# Patient Record
Sex: FEMALE | Race: WHITE | NOT HISPANIC OR LATINO | URBAN - NONMETROPOLITAN AREA
[De-identification: names, ages, dates, MRNs, and addresses within clinical notes are randomized per-mention and may not be internally consistent; named-entity substitution may affect disease eponyms.]

---

## 2023-11-06 ENCOUNTER — HOSPITAL ENCOUNTER (EMERGENCY)
Facility: HOSPITAL | Age: 48
Discharge: HOME | End: 2023-11-06
Attending: FAMILY MEDICINE
Payer: MEDICAID

## 2023-11-06 VITALS
DIASTOLIC BLOOD PRESSURE: 82 MMHG | HEIGHT: 62 IN | HEART RATE: 88 BPM | TEMPERATURE: 98 F | OXYGEN SATURATION: 99 % | WEIGHT: 134 LBS | SYSTOLIC BLOOD PRESSURE: 122 MMHG | RESPIRATION RATE: 18 BRPM | BODY MASS INDEX: 24.66 KG/M2

## 2023-11-06 DIAGNOSIS — K11.8 PAROTID GLAND FULLNESS: ICD-10-CM

## 2023-11-06 DIAGNOSIS — K11.5 CALCULUS OF PAROTID GLAND: Primary | ICD-10-CM

## 2023-11-06 PROCEDURE — 99283 EMERGENCY DEPT VISIT LOW MDM: CPT

## 2023-11-06 RX ORDER — AMOXICILLIN AND CLAVULANATE POTASSIUM 875; 125 MG/1; MG/1
1 TABLET, FILM COATED ORAL 2 TIMES DAILY
Qty: 20 TABLET | Refills: 0 | Status: SHIPPED | OUTPATIENT
Start: 2023-11-06 | End: 2023-11-16

## 2023-11-06 NOTE — DISCHARGE INSTRUCTIONS
If gland swelling becomes more prominent, you develop fevers, with the area becomes red hard or swollen, returned to the emergency room or see your PCP for further evaluation.  If it does not resolve within 2 weeks, also consider seeing your PCP for a recheck.

## 2023-11-06 NOTE — ED PROVIDER NOTES
Encounter Date: 11/6/2023       History     Chief Complaint   Patient presents with    Facial Swelling     ONSET TONIGHT. PT C/O LEFT SIDED FACIAL SWELLING. DENIES PAIN. LOOKED IT UP ON GOGGLE AND IT SCARED THEM WHAT IT MIGHT COULD BE.      Patient presents to the emergency room with a 1 day history of swelling in the left side of her face.  She describes it happened all the sudden.  She denies fever chills and only has mild uncomfortable feeling.    The history is provided by the patient.     Review of patient's allergies indicates:  No Known Allergies  History reviewed. No pertinent past medical history.  History reviewed. No pertinent surgical history.  History reviewed. No pertinent family history.     Review of Systems   Constitutional:  Negative for fever.   HENT:  Negative for sore throat.    Respiratory:  Negative for shortness of breath.    Cardiovascular:  Negative for chest pain.   Gastrointestinal:  Negative for nausea.   Genitourinary:  Negative for dysuria.   Musculoskeletal:  Negative for back pain.   Skin:  Negative for rash.   Neurological:  Negative for weakness.   Hematological:  Does not bruise/bleed easily.   All other systems reviewed and are negative.      Physical Exam     Initial Vitals [11/06/23 0231]   BP Pulse Resp Temp SpO2   125/87 102 18 98.5 °F (36.9 °C) 100 %      MAP       --         Physical Exam    Nursing note and vitals reviewed.  Constitutional:   Agitated throughout exam but alert and oriented x4   HENT:   Left parotid gland area mildly swollen no erythema a and minimally tender.  Oropharynx slight prominence of the parotid gland    Eyes: EOM are normal. Pupils are equal, round, and reactive to light.   Neck: Neck supple.   Normal range of motion.  Cardiovascular:  Normal rate, regular rhythm and normal heart sounds.           Pulmonary/Chest: Breath sounds normal.   Abdominal: Abdomen is soft. Bowel sounds are normal. There is no abdominal tenderness.   Musculoskeletal:          General: No edema. Normal range of motion.      Cervical back: Normal range of motion and neck supple.     Neurological: She is alert and oriented to person, place, and time.   Skin: Skin is warm and dry. Capillary refill takes less than 2 seconds.   Psychiatric: She has a normal mood and affect.         ED Course   Procedures  Labs Reviewed - No data to display       Imaging Results    None          Medications - No data to display  Medical Decision Making                             Clinical Impression:   Final diagnoses:  [K11.8] Parotid gland fullness  [K11.5] Calculus of parotid gland (Primary)        ED Disposition Condition    Discharge Stable          ED Prescriptions       Medication Sig Dispense Start Date End Date Auth. Provider    amoxicillin-clavulanate 875-125mg (AUGMENTIN) 875-125 mg per tablet Take 1 tablet by mouth 2 (two) times daily. for 10 days 20 tablet 11/6/2023 11/16/2023 Jayme Canas MD          Follow-up Information       Follow up With Specialties Details Why Contact Info    Your PCP  Call  As needed              Jayme Canas MD  11/06/23 025